# Patient Record
Sex: MALE | Race: WHITE | NOT HISPANIC OR LATINO | Employment: FULL TIME | URBAN - METROPOLITAN AREA
[De-identification: names, ages, dates, MRNs, and addresses within clinical notes are randomized per-mention and may not be internally consistent; named-entity substitution may affect disease eponyms.]

---

## 2021-05-25 ENCOUNTER — RECORDS - HEALTHEAST (OUTPATIENT)
Dept: ADMINISTRATIVE | Facility: CLINIC | Age: 26
End: 2021-05-25

## 2022-09-25 ENCOUNTER — HOSPITAL ENCOUNTER (EMERGENCY)
Facility: HOSPITAL | Age: 27
Discharge: HOME | End: 2022-09-25
Attending: EMERGENCY MEDICINE | Admitting: EMERGENCY MEDICINE
Payer: COMMERCIAL

## 2022-09-25 VITALS
DIASTOLIC BLOOD PRESSURE: 78 MMHG | RESPIRATION RATE: 20 BRPM | BODY MASS INDEX: 35 KG/M2 | WEIGHT: 250 LBS | SYSTOLIC BLOOD PRESSURE: 137 MMHG | HEART RATE: 88 BPM | HEIGHT: 71 IN | OXYGEN SATURATION: 100 % | TEMPERATURE: 98.3 F

## 2022-09-25 DIAGNOSIS — F41.9 ANXIETY: ICD-10-CM

## 2022-09-25 DIAGNOSIS — G47.00 PERSISTENT INSOMNIA: ICD-10-CM

## 2022-09-25 PROBLEM — E66.811 OBESITY (BMI 30.0-34.9): Status: ACTIVE | Noted: 2019-07-26

## 2022-09-25 PROCEDURE — 99283 EMERGENCY DEPT VISIT LOW MDM: CPT

## 2022-09-25 PROCEDURE — 250N000013 HC RX MED GY IP 250 OP 250 PS 637: Performed by: EMERGENCY MEDICINE

## 2022-09-25 RX ORDER — OLANZAPINE 10 MG/1
10 TABLET, ORALLY DISINTEGRATING ORAL
Qty: 10 TABLET | Refills: 0 | Status: SHIPPED | OUTPATIENT
Start: 2022-09-25

## 2022-09-25 RX ORDER — OLANZAPINE 10 MG/1
10 TABLET, ORALLY DISINTEGRATING ORAL ONCE
Status: COMPLETED | OUTPATIENT
Start: 2022-09-25 | End: 2022-09-25

## 2022-09-25 RX ADMIN — OLANZAPINE 10 MG: 10 TABLET, ORALLY DISINTEGRATING ORAL at 20:01

## 2022-09-25 ASSESSMENT — ACTIVITIES OF DAILY LIVING (ADL): ADLS_ACUITY_SCORE: 35

## 2022-09-25 NOTE — ED TRIAGE NOTES
Patient has been experiencing anxiety and anxiety attacks for a couple weeks. Worries that when if he falls asleep he wont wake up and so has had virtually no sleep for past 10 days. Wednesday was at [facility] and was given lorazepam that did not help Later called on call doctor and was prescribed hydroxyzine. Was taking every hour, finally helped calm him down two days into taking it every hour but still unable to sleep. Last took about two hours ago.      Triage Assessment     Row Name 09/25/22 2491       Triage Assessment (Adult)    Airway WDL WDL       Respiratory WDL    Respiratory WDL WDL       Skin Circulation/Temperature WDL    Skin Circulation/Temperature WDL WDL       Cardiac WDL    Cardiac WDL WDL       Peripheral/Neurovascular WDL    Peripheral Neurovascular WDL WDL       Cognitive/Neuro/Behavioral WDL    Cognitive/Neuro/Behavioral WDL X;mood/behavior    Mood/Behavior anxious

## 2022-09-26 NOTE — DISCHARGE INSTRUCTIONS
Please follow-up with your Primary Care Provider this upcoming week; call in the morning to arrange appointment.    Return to the ER for worsening anxiety or other concerns.

## 2022-09-26 NOTE — ED PROVIDER NOTES
Emergency Department Encounter     Evaluation Date & Time:   2022  7:08 PM    CHIEF COMPLAINT:  Anxiety      Triage Note:  Patient has been experiencing anxiety and anxiety attacks for a couple weeks. Worries that when if he falls asleep he wont wake up and so has had virtually no sleep for past 10 days. Wednesday was at [facility] and was given lorazepam that did not help Later called on call doctor and was prescribed hydroxyzine. Was taking every hour, finally helped calm him down two days into taking it every hour but still unable to sleep. Last took about two hours ago.      Triage Assessment     Row Name 22 [id number]       Triage Assessment (Adult)    Airway WDL WDL       Respiratory WDL    Respiratory WDL WDL       Skin Circulation/Temperature WDL    Skin Circulation/Temperature WDL WDL       Cardiac WDL    Cardiac WDL WDL       Peripheral/Neurovascular WDL    Peripheral Neurovascular WDL WDL       Cognitive/Neuro/Behavioral WDL    Cognitive/Neuro/Behavioral WDL X;mood/behavior    Mood/Behavior anxious                    Impression and Plan       FINAL IMPRESSION:    ICD-10-CM    1. Anxiety  F41.9    2. Persistent insomnia  G47.00          ED COURSE & MEDICAL DECISION MAKIN:20 PM I met with the patient and performed my initial exam.  I discussed the plan for care and the patient is agreeable with this plan. PPE: Provider wore gloves and paper mask.    8:14 PM I checked the patient.   9:00 PM I discussed the plan for discharge with the patient, and patient is agreeable. We discussed supportivecares at home and reasons for return to the ER including new or worsening symptoms - all questions and concerns addressed. Patient to be discharged by RN.     27 year old male, history of obesity, pervasive developmental disorder, expressive language disorder, episodic mood disorder, ADHD and anxiety disorder, who presents with his dad for evaluation of anxiety. Patient has longstanding symptoms of anxiety, which wax  and wane over the years. Recently, he has been having worsening anxiety that has prevented him from sleeping. Dad reports that he has not been able to fall into a deep sleep for 10 days. They have tried OTC Unisom and prescribed Ativan and hydroxyzine without success. Patient denies inciting event that has worsened his anxiety, however the cycle of insomnia is making his anxiety even worse.     Exam unremarkable.    I spoke with the pharmacist regarding the safety profile of Zyprexa vs Seroquel - she reports either would be equally safe.  Given sublingual form of Zyprexa, we will try this in the ED as it may have faster onset.    Patient given 10 Mg Zydis and was feeling improved and drowsy, like he would be able to sleep at home.    Patient discharged to home with dad and close follow-up with his primary care provider.  I did prescribe Zydis 10mg at bedtime, prn.  Return precautions provided.      At the conclusion of the encounter I discussed the results of all the tests and the disposition. The questions were answered. The patient and family acknowledged understanding and were agreeable with the care plan.      MEDICATIONS GIVEN IN THE EMERGENCY DEPARTMENT:  Medications   OLANZapine zydis (zyPREXA) ODT tab 10 mg (10 mg Oral Given 9/25/22 2001)       NEW PRESCRIPTIONS STARTED AT TODAY'S ED VISIT:  Discharge Medication List as of 9/25/2022  9:09 PM      START taking these medications    Details   OLANZapine zydis (ZYPREXA) 10 MG ODT Take 1 tablet (10 mg) by mouth nightly as needed (anxiety, insomnia), Disp-10 tablet, R-0, Local Print             HPI     HPI   Tj Hilton is a 27 year old male with a pertinent medical history of obesity, pervasive developmental disorder, expressive language disorder, episodic mood disorder, ADHD and anxiety disorder, who presents to the ED by personal vehicle with his dad for evaluation of anxiety.     The patient report longstanding symptoms of anxiety, which wax and wane over  the years. Recently, he has been having worsening anxiety that has prevented him from sleeping. Dad reports that he has not been able to fall into a deep sleep for 10 days. They have been seen at [facility] and have tried the prescribed Ativan and hydroxyzine without success.  They also have tried over-the-counter sleep aids, including Unisom. Patient denies any inciting event that has worsened his anxiety.  Patient is currently not on any medications and does not have a therapist.    Patient denies any physical complaints; specifically he denies headache, chest pain, shortness of breath, abdominal pain, N/V/D, cough, fever or other concerns.    Per chart review, the patient presented to the [facility] on 9/21 for anxiety. There is a history of anxiety in the past and he is not on daily medications. There was no sign of a general medical problem causing anxiety related symptoms (PE, hyperthyroidism, cardiac ischemia, asthma exacerbation, aortic dissection, other metabolic derangement, infection, etc). There were no signs of serious psychiatric decompensation warranting psychiatric hospitalization or 72 hold. Overall he felt his heart was racing but upon arrival his EKG showed sinus rhythm. Cardiac work-up was unremarkable without findings to suggest MI, ACS or cardiac ischemia. Overall the provider felt the majority of his symptoms were related to anxiety and panic attacks. He specifically had been sleeping over the last 5 days and had not benefited from Benadryl. Therefore the provider did go over the risks and benefits of benzodiazepines and was given a prescription for 5 tablets to use only with severe symptoms. He denied thoughts to hurt himself or others and the provider felt the next appropriate follow-up was with primary to consider ongoing treatment for his anxiety. The patient was prescribed ativan. The patient was prescribed hydroxyzine starting 9/22.       REVIEW OF  "SYSTEMS:  All other systems reviewed and are negative.      Medical History     History reviewed. No pertinent past medical history.    History reviewed. No pertinent surgical history.    History reviewed. No pertinent family history.         OLANZapine zydis (ZYPREXA) 10 MG ODT        Physical Exam     First Vitals:  Patient Vitals for the past 24 hrs:   BP Temp Temp src Pulse Resp SpO2 Height Weight   09/25/22 2126 137/78 -- -- 88 20 100 % -- --   09/25/22 1731 (!) 167/71 98.3  F (36.8  C) Oral 106 20 100 % 1.803 m (5' 11\") 113.4 kg (250 lb)       PHYSICAL EXAM:   Physical Exam    GENERAL: Awake, alert.  In no acute distress.   HEENT: Normocephalic, atraumatic. Pupils equal, round and reactive. Conjunctiva normal.   NECK: No stridor.  PULMONARY: Symmetrical breath sounds without distress.  Lungs clear to auscultation bilaterally without wheezes, rhonchi or rales.  CARDIO: Regular rate and rhythm.  No significant murmur, rub or gallop.    ABDOMINAL: Abdomen soft, non-distended and non-tender to palpation.    EXTREMITIES: No lower extremity swelling or edema.      NEURO: Alert and oriented to person, place and time.  Cranial nerves grossly intact.  No focal motor deficit.  PSYCH: Normal mood and affect.  SKIN: No rashes.     I, Jae Miller, am serving as a scribe to document services personally performed by Katie Holliday MD based on my observation and the provider's statements to me. I, Katie Holliday MD attest that Jae Miller is acting in a scribe capacity, has observed my performance of the services and has documented them in accordance with my direction.    Katie Holliday MD  Emergency Medicine  [facility] EMERGENCY DEPARTMENT         Katie Holliday MD  09/26/22 1101    "